# Patient Record
Sex: FEMALE | Race: WHITE | URBAN - METROPOLITAN AREA
[De-identification: names, ages, dates, MRNs, and addresses within clinical notes are randomized per-mention and may not be internally consistent; named-entity substitution may affect disease eponyms.]

---

## 2017-10-06 ENCOUNTER — HOSPITAL ENCOUNTER (EMERGENCY)
Facility: CLINIC | Age: 77
Discharge: OTHER INSTITUTION | End: 2017-10-06
Attending: EMERGENCY MEDICINE | Admitting: EMERGENCY MEDICINE
Payer: MEDICARE

## 2017-10-06 VITALS
HEIGHT: 64 IN | DIASTOLIC BLOOD PRESSURE: 73 MMHG | RESPIRATION RATE: 18 BRPM | HEART RATE: 99 BPM | WEIGHT: 161 LBS | OXYGEN SATURATION: 99 % | SYSTOLIC BLOOD PRESSURE: 150 MMHG | BODY MASS INDEX: 27.49 KG/M2 | TEMPERATURE: 98.3 F

## 2017-10-06 DIAGNOSIS — K92.2 UPPER GI BLEED: ICD-10-CM

## 2017-10-06 DIAGNOSIS — R79.1 SUPRATHERAPEUTIC INR: ICD-10-CM

## 2017-10-06 DIAGNOSIS — D64.9 SYMPTOMATIC ANEMIA: ICD-10-CM

## 2017-10-06 LAB
ABO + RH BLD: NORMAL
ABO + RH BLD: NORMAL
ANION GAP SERPL CALCULATED.3IONS-SCNC: 11 MMOL/L (ref 3–14)
BASOPHILS # BLD AUTO: 0 10E9/L (ref 0–0.2)
BASOPHILS NFR BLD AUTO: 0.4 %
BLD GP AB SCN SERPL QL: NORMAL
BLD PROD TYP BPU: NORMAL
BLD UNIT ID BPU: 0
BLD UNIT ID BPU: 0
BLOOD BANK CMNT PATIENT-IMP: NORMAL
BLOOD PRODUCT CODE: NORMAL
BLOOD PRODUCT CODE: NORMAL
BPU ID: NORMAL
BPU ID: NORMAL
BUN SERPL-MCNC: 53 MG/DL (ref 7–30)
CALCIUM SERPL-MCNC: 8.2 MG/DL (ref 8.5–10.1)
CHLORIDE SERPL-SCNC: 103 MMOL/L (ref 94–109)
CO2 SERPL-SCNC: 21 MMOL/L (ref 20–32)
CREAT SERPL-MCNC: 0.86 MG/DL (ref 0.52–1.04)
DIFFERENTIAL METHOD BLD: ABNORMAL
EOSINOPHIL # BLD AUTO: 0.2 10E9/L (ref 0–0.7)
EOSINOPHIL NFR BLD AUTO: 2.6 %
ERYTHROCYTE [DISTWIDTH] IN BLOOD BY AUTOMATED COUNT: 16.1 % (ref 10–15)
GFR SERPL CREATININE-BSD FRML MDRD: 64 ML/MIN/1.7M2
GLUCOSE SERPL-MCNC: 119 MG/DL (ref 70–99)
HCT VFR BLD AUTO: 17 % (ref 35–47)
HGB BLD-MCNC: 5.7 G/DL (ref 11.7–15.7)
IMM GRANULOCYTES # BLD: 0.1 10E9/L (ref 0–0.4)
IMM GRANULOCYTES NFR BLD: 1.3 %
INR PPP: 1.38 (ref 0.86–1.14)
INR PPP: 3.5 (ref 0.86–1.14)
LYMPHOCYTES # BLD AUTO: 1.2 10E9/L (ref 0.8–5.3)
LYMPHOCYTES NFR BLD AUTO: 15.2 %
MCH RBC QN AUTO: 32.4 PG (ref 26.5–33)
MCHC RBC AUTO-ENTMCNC: 33.5 G/DL (ref 31.5–36.5)
MCV RBC AUTO: 97 FL (ref 78–100)
MONOCYTES # BLD AUTO: 0.6 10E9/L (ref 0–1.3)
MONOCYTES NFR BLD AUTO: 6.9 %
NEUTROPHILS # BLD AUTO: 5.9 10E9/L (ref 1.6–8.3)
NEUTROPHILS NFR BLD AUTO: 73.6 %
NRBC # BLD AUTO: 0 10*3/UL
NRBC BLD AUTO-RTO: 1 /100
NUM BPU REQUESTED: 2
PLATELET # BLD AUTO: 181 10E9/L (ref 150–450)
POTASSIUM SERPL-SCNC: 3.6 MMOL/L (ref 3.4–5.3)
RBC # BLD AUTO: 1.76 10E12/L (ref 3.8–5.2)
SODIUM SERPL-SCNC: 135 MMOL/L (ref 133–144)
SPECIMEN EXP DATE BLD: NORMAL
TRANSFUSION STATUS PATIENT QL: NORMAL
WBC # BLD AUTO: 8 10E9/L (ref 4–11)

## 2017-10-06 PROCEDURE — 86901 BLOOD TYPING SEROLOGIC RH(D): CPT | Performed by: EMERGENCY MEDICINE

## 2017-10-06 PROCEDURE — 80048 BASIC METABOLIC PNL TOTAL CA: CPT | Performed by: EMERGENCY MEDICINE

## 2017-10-06 PROCEDURE — 86923 COMPATIBILITY TEST ELECTRIC: CPT | Performed by: EMERGENCY MEDICINE

## 2017-10-06 PROCEDURE — 36430 TRANSFUSION BLD/BLD COMPNT: CPT

## 2017-10-06 PROCEDURE — 85025 COMPLETE CBC W/AUTO DIFF WBC: CPT | Performed by: EMERGENCY MEDICINE

## 2017-10-06 PROCEDURE — 25000128 H RX IP 250 OP 636: Performed by: EMERGENCY MEDICINE

## 2017-10-06 PROCEDURE — 96365 THER/PROPH/DIAG IV INF INIT: CPT

## 2017-10-06 PROCEDURE — 85610 PROTHROMBIN TIME: CPT | Performed by: EMERGENCY MEDICINE

## 2017-10-06 PROCEDURE — 85610 PROTHROMBIN TIME: CPT | Mod: 91 | Performed by: EMERGENCY MEDICINE

## 2017-10-06 PROCEDURE — C9132 KCENTRA, PER I.U.: HCPCS | Performed by: EMERGENCY MEDICINE

## 2017-10-06 PROCEDURE — 93005 ELECTROCARDIOGRAM TRACING: CPT

## 2017-10-06 PROCEDURE — 86850 RBC ANTIBODY SCREEN: CPT | Performed by: EMERGENCY MEDICINE

## 2017-10-06 PROCEDURE — 25000555 ZZHC RX FACTOR IP 250 OP 636: Performed by: EMERGENCY MEDICINE

## 2017-10-06 PROCEDURE — 96375 TX/PRO/DX INJ NEW DRUG ADDON: CPT

## 2017-10-06 PROCEDURE — 99285 EMERGENCY DEPT VISIT HI MDM: CPT | Mod: 25

## 2017-10-06 PROCEDURE — 86900 BLOOD TYPING SEROLOGIC ABO: CPT | Performed by: EMERGENCY MEDICINE

## 2017-10-06 PROCEDURE — P9016 RBC LEUKOCYTES REDUCED: HCPCS | Performed by: EMERGENCY MEDICINE

## 2017-10-06 RX ORDER — LORAZEPAM 2 MG/ML
0.25 INJECTION INTRAMUSCULAR ONCE
Status: COMPLETED | OUTPATIENT
Start: 2017-10-06 | End: 2017-10-06

## 2017-10-06 RX ADMIN — PROTHROMBIN, COAGULATION FACTOR VII HUMAN, COAGULATION FACTOR IX HUMAN, COAGULATION FACTOR X HUMAN, PROTEIN C, PROTEIN S HUMAN, AND WATER 1725 UNITS: KIT at 18:35

## 2017-10-06 RX ADMIN — LORAZEPAM 0.25 MG: 2 INJECTION INTRAMUSCULAR; INTRAVENOUS at 19:15

## 2017-10-06 RX ADMIN — PHYTONADIONE 10 MG: 10 INJECTION, EMULSION INTRAMUSCULAR; INTRAVENOUS; SUBCUTANEOUS at 18:06

## 2017-10-06 NOTE — ED NOTES
Pt extremely restless, thrashing around in bed. C/O right sided chest pain now. Dr. Pearson notified.

## 2017-10-06 NOTE — ED NOTES
Bed: ED05  Expected date: 10/6/17  Expected time: 4:03 PM  Means of arrival: Ambulance  Comments:  441 77F weakness

## 2017-10-06 NOTE — ED NOTES
DATE:  10/6/2017   TIME OF RECEIPT FROM LAB:  8734  LAB TEST:  Hgb  LAB VALUE:  5.7  RESULTS GIVEN WITH READ-BACK TO (PROVIDER):  Syed Pearson, *  TIME LAB VALUE REPORTED TO PROVIDER:   6871

## 2017-10-06 NOTE — ED PROVIDER NOTES
History     Chief Complaint:  Dark stool and generalized weakness    HPI   Kelly Reece is a 77 year old anticoagulated female with dementia who presents with dark stool and generalized weakness. A month ago on 09/12/17, the patient had a hip surgery for left hip fracture in [place]. The surgery was without complication and she has been at [facility] for rehab and physical therapy. The patient has been using prophylactic warfarin the past month as well as full aspirin and celecoxib. For the past few days, the patient has noticed dark stool though was waiting for her upcoming physical to be evaluated. Yesterday, the patient felt more tired than usual and was unable to complete her physical therapy due to fatigue. She felt weak again today and at 1300, her hemoglobin turned a critical value of 6.3 and she was sent to the ED here from the rehab center. The patient denies abdominal pain and vomiting.  She notes an appendectomy many years ago and denies history of GI bleed. She has pain with her hip and has been ambulatory with a walker.     Recent blood marks:  September 14th: Hemoglobin 9.3  October 2nd INR: 2.8  October 6th INR: 1.8  October 6th: hemoglobin 6.3    Allergies:  No Known Drug Allergies    Medications:    Warfarin  Aspirin (full)  Celecoxib     Past Medical History:    HTN  dementia    Past Surgical History:    Orthopedic surgery  Appendectomy   Family History:    The patient denies any relevant family medical history.    Social History:  The patient was accompanied to the ED by her  and daughter.  Smoking Status: never  Smokeless Tobacco: never  Alcohol Use: yes    Marital Status:   [2]    Review of Systems   All other systems reviewed and are negative.    Physical Exam     Patient Vitals for the past 24 hrs:   BP Temp Temp src Pulse Resp SpO2 Height Weight   10/06/17 2130 150/73 98.3  F (36.8  C) Oral 99 18 99 % - -   10/06/17 2118 140/81 98.2  F (36.8  C) Oral 105 18 99 %  "- -   10/06/17 2111 - 98.2  F (36.8  C) Oral - - - - -   10/06/17 2100 (!) 130/98 - - 103 18 98 % - -   10/06/17 2033 - - - 99 - 99 % - -   10/06/17 2030 131/78 - - - - - - -   10/06/17 2018 - 97.6  F (36.4  C) Oral 103 - 100 % - -   10/06/17 2015 96/55 - - - - - - -   10/06/17 1945 125/88 - - 105 - 99 % - -   10/06/17 1940 125/90 98.2  F (36.8  C) Oral 114 18 99 % - -   10/06/17 1936 - - - 104 - 98 % - -   10/06/17 1930 107/78 - - - - 97 % - -   10/06/17 1929 - - - - - 98 % - -   10/06/17 1926 138/67 98.4  F (36.9  C) Oral 104 18 99 % - -   10/06/17 1915 132/68 - - 124 20 92 % - -   10/06/17 1900 134/64 - - - - 93 % - -   10/06/17 1852 - - - 111 20 99 % - -   10/06/17 1845 150/67 - - - - - - -   10/06/17 1843 - - - 107 - 100 % - -   10/06/17 1830 117/49 - - - - - - -   10/06/17 1815 (!) 135/108 - - - - - - -   10/06/17 1800 107/60 - - 111 - 100 % - -   10/06/17 1745 107/71 - - - - 100 % - -   10/06/17 1730 117/55 - - 107 - - - -   10/06/17 1715 142/61 - - 102 - 100 % - -   10/06/17 1700 (!) 119/94 - - 110 16 100 % - -   10/06/17 1645 (!) 122/95 - - 101 - 99 % - -   10/06/17 1634 112/74 - - 105 - 100 % - -   10/06/17 1633 - 98.2  F (36.8  C) Oral 106 16 100 % 1.626 m (5' 4\") 73 kg (161 lb)         Physical Exam  General: ill appearing elderly woman semi-recumbent  HENT: mucous membranes moist  CV: mildly tachycardic rate, regular rhythm  Resp: clear throughout, normal effort, no crackles or wheezing  GI: abdomen soft and nontender, no guarding  Rectal: Dried melena around anus  Normal rectal tone  No palpable internal masses or tenderness  Soft formed melena in rectal vault  MSK: no bony tenderness  Skin: appears pale, skin dry  Neuro: alert, clear speech, moves all extremities with good tone though poor historian  Psych: initially calm, later somewhat agitated      Emergency Department Course   ECG:  Indication: generalized weakness  Completed at 1908.  Read at 1913.   Rate 113 bpm. CA interval *. QRS duration 72. " QT/QTc 332/455. P-R-T axes * 3 117    Significant artifact, patient shaking. Regular narrow complex tachycardia, suspect sinus though cannot determine rhythm with certainty.    Laboratory:  CBC: RBC 1.76 (L), HGB 5.7 (LL), HCT 17.0 (L), RDW 16.1 (H), Nucleated RBCs 1 (H) o/w WNL. (WBC 8.0, )   BMP: Glucose 119 (H), BUN 53 (H), Calcium 8.2 (L), o/w WNL (Creatinine: 0.86)  ABO/Rh type: O+  INR: 3.5 (H)  Blood component: ready for patient 1817  Blood component: ready for patient 1817    Interventions:  1806 Aqua-Mephyton, 10 mg in NaCl 0.9%, 10 mg, IV  1835 Kcentra, infusion 1,725 units, IV  1915 Ativan, 0.25 mg, IV  PRBC transfusion 2 units    Emergency Department Course:  Nursing notes and vitals reviewed. I performed an exam of the patient as documented above.     IV inserted. Medicine administered as documented above. Blood drawn. This was sent to the lab for further testing, results above.    The patient was placed on continuous cardiac and pulse ox monitoring.  I performed electronic chart review in EPIC.    1730 I spoke with the [facility] who informed me that there are no available ICU beds here at the hospital    1735 I rechecked the patient to discuss possible transfer destinations    1800 I spoke with the [facility] again regarding possible transfer destinations. I have not heard from [facility] yet.    1812 I spoke with a nursing supervisor at [facility] regarding the patients work up, plan, and transfer destination.     1824 I consulted with Dr. Kaur of [facility] regarding the patient's history, presentation here in the emergency department and transfer plans.      1827 I spoke with the [facility] who is to contact [facility].    1828 I spoke with Dr. Kaur of [facility] who confirmed that they are unable to take the patient due to lack of available beds.     1841 I spoke with the hospitalist at [facility] who will contact his general surgeon regarding the patient.     1846 I rechecked the patient who appeared agitated,  restless, whimpering in bed. , pulse 100s. Her daughter was informed of the plan thus far.    1902  I consulted with Dr. Paul of the hospitalist services at [facility]. They are in agreement to accept the patient for direct admission.    1904 I rechecked the patient in regards to the plan to transfer to [facility]     EKG obtained in the ED, see results above.     Findings and plan explained to the Patient who consents to admission. Discussed the patient with Dr. Paul, who will admit the patient to an ICU bed for further monitoring, evaluation, and treatment.    Impression & Plan    Medical Decision Making:  This pleasant 78 y/o female on aspirin, celecoxib and prophylactic coumadin presents with multiple days of melena and progressive weakness which is think is due to symptomatic anemia from acute upper GI blood loss.  Physical exam confirms melena.  She is mildly tachycardic though normotensive. Her multiple blood thinners certainly place her at high risk for peptic ulcer disease or other cause of GI bleed. 2 IVs were established.  I explained the rational for urgent transfusion and reversal of her anti coagulation with the patient's daughter at bedside. Unfortunately, there are no ICU beds available at [facility] nor at the [facility] this evening.  I recommended [facility] or [facility], fairly close [facility] hospitals that I felt would be most likely to have the capacity to handle her situation, though the patient and her family preferred [facility] so I pursued transfer there though this was unavailable due to staffing issues. I then made multiple phone calls to ultimately secure an ICU bed at [facility] in the ICU for direct admission after confirming that they have the appropriate specialty backup.  During her multiple hours in the ED, the patient became fairly restless. EKG was performed and is difficult to interpret due to significant artifact. Even if this happened to be some arrhythmia such as a-fib, I would not  give any rate controlling medication in this setting given the potential for hemodynamic instability and the fact that she is also supratherapeutically anticoagulated. Blood products and urgent reversal of anticoagulation were indicated and ordered immediately after my first visit to her room. I did my best to keep the patient and family updated, explaining that significant delay was incurred due to the need for transfer and numerous phone calls related to this.       Diagnosis:    ICD-10-CM    1. Upper GI bleed K92.2    2. Symptomatic anemia D64.9    3. Supratherapeutic INR R79.1    4.      Hemorrhagic shock      Critical Care Time: Over 90 minutes, independent of procedures.  This included time spent obtaining a history and physical, reviewing the patient's chart, interpreting lab and imaging studies, re-examining the patient, coordinating care with other providers, and documenting ED care provided.  She has acute blood loss anemia, presumably ongoing upper GI bleed, with tachycardia and some agitation consistent with hemorrhage shock/hypoperfusion of CNS.  She required aggressive resuscitation with blood product tranfusion and emergent reversal of her anticoagulation to minimize risk of morbidity/mortality, though she remains critically ill.          Disposition:  [facility], am serving as a scribe on 10/6/2017 at 5:01 PM to personally document services performed by Syed Pearson, * based on my observations and the provider's statements to me.     Jet Briseno  10/6/2017    EMERGENCY DEPARTMENT       Syed Pearson MD  10/07/17 0131

## 2017-10-07 LAB — INTERPRETATION ECG - MUSE: NORMAL

## 2017-10-07 NOTE — ED NOTES
Nurse walking by patient room and pt standing at sink with IV in hand. Pulled out IV from left forearm. Pt assisted back to bed and cleaned up. Blood restarted in IV in left AC. Pt's daughter has left so curtain to patient room left open for direct visualization.

## 2017-10-07 NOTE — ED NOTES
Pt calmed down briefly after Ativan given and heart rate went down to around 100. Denied any chest pain at that point. Began to get restless again after about 15 minutes and again began c/o chest pain. EKG has been done.